# Patient Record
Sex: MALE | Race: WHITE | Employment: FULL TIME | URBAN - METROPOLITAN AREA
[De-identification: names, ages, dates, MRNs, and addresses within clinical notes are randomized per-mention and may not be internally consistent; named-entity substitution may affect disease eponyms.]

---

## 2019-09-11 ENCOUNTER — HOSPITAL ENCOUNTER (OUTPATIENT)
Age: 49
Setting detail: OUTPATIENT SURGERY
Discharge: HOME | End: 2019-09-11
Attending: UROLOGY | Admitting: UROLOGY
Payer: COMMERCIAL

## 2019-09-11 VITALS
DIASTOLIC BLOOD PRESSURE: 76 MMHG | HEIGHT: 76 IN | WEIGHT: 172.56 LBS | OXYGEN SATURATION: 96 % | HEART RATE: 63 BPM | TEMPERATURE: 98.1 F | SYSTOLIC BLOOD PRESSURE: 114 MMHG | BODY MASS INDEX: 21.01 KG/M2 | RESPIRATION RATE: 18 BRPM

## 2019-09-11 DIAGNOSIS — N20.0 KIDNEY STONE: Primary | ICD-10-CM

## 2019-09-11 PROCEDURE — 74011250636 HC RX REV CODE- 250/636

## 2019-09-11 PROCEDURE — 99153 MOD SED SAME PHYS/QHP EA: CPT

## 2019-09-11 PROCEDURE — 77030040361 HC SLV COMPR DVT MDII -B: Performed by: UROLOGY

## 2019-09-11 PROCEDURE — 76210000020 HC REC RM PH II FIRST 0.5 HR: Performed by: UROLOGY

## 2019-09-11 PROCEDURE — 77030020782 HC GWN BAIR PAWS FLX 3M -B: Performed by: UROLOGY

## 2019-09-11 PROCEDURE — 50590 FRAGMENTING OF KIDNEY STONE: CPT | Performed by: UROLOGY

## 2019-09-11 PROCEDURE — 99152 MOD SED SAME PHYS/QHP 5/>YRS: CPT

## 2019-09-11 PROCEDURE — 74011250636 HC RX REV CODE- 250/636: Performed by: UROLOGY

## 2019-09-11 RX ORDER — SODIUM CHLORIDE, SODIUM LACTATE, POTASSIUM CHLORIDE, CALCIUM CHLORIDE 600; 310; 30; 20 MG/100ML; MG/100ML; MG/100ML; MG/100ML
125 INJECTION, SOLUTION INTRAVENOUS CONTINUOUS
Status: DISCONTINUED | OUTPATIENT
Start: 2019-09-11 | End: 2019-09-11 | Stop reason: HOSPADM

## 2019-09-11 RX ORDER — TAMSULOSIN HYDROCHLORIDE 0.4 MG/1
0.4 CAPSULE ORAL DAILY
COMMUNITY

## 2019-09-11 RX ORDER — NALOXONE HYDROCHLORIDE 1 MG/ML
1 INJECTION INTRAMUSCULAR; INTRAVENOUS; SUBCUTANEOUS AS NEEDED
Status: DISCONTINUED | OUTPATIENT
Start: 2019-09-11 | End: 2019-09-11 | Stop reason: HOSPADM

## 2019-09-11 RX ORDER — FLUMAZENIL 0.1 MG/ML
0.5 INJECTION INTRAVENOUS AS NEEDED
Status: DISCONTINUED | OUTPATIENT
Start: 2019-09-11 | End: 2019-09-11 | Stop reason: HOSPADM

## 2019-09-11 RX ORDER — OXYCODONE AND ACETAMINOPHEN 5; 325 MG/1; MG/1
2 TABLET ORAL
Status: DISCONTINUED | OUTPATIENT
Start: 2019-09-11 | End: 2019-09-11 | Stop reason: HOSPADM

## 2019-09-11 RX ORDER — HYDROCODONE BITARTRATE AND ACETAMINOPHEN 10; 325 MG/1; MG/1
1 TABLET ORAL
Qty: 10 TAB | Refills: 0 | Status: SHIPPED | OUTPATIENT
Start: 2019-09-11 | End: 2019-09-21

## 2019-09-11 RX ORDER — CEFAZOLIN SODIUM/WATER 2 G/20 ML
2 SYRINGE (ML) INTRAVENOUS ONCE
Status: COMPLETED | OUTPATIENT
Start: 2019-09-11 | End: 2019-09-11

## 2019-09-11 RX ORDER — TAMSULOSIN HYDROCHLORIDE 0.4 MG/1
0.4 CAPSULE ORAL DAILY
Qty: 15 CAP | Refills: 0 | Status: SHIPPED | OUTPATIENT
Start: 2019-09-11

## 2019-09-11 RX ORDER — OXYCODONE AND ACETAMINOPHEN 5; 325 MG/1; MG/1
1 TABLET ORAL
Status: DISCONTINUED | OUTPATIENT
Start: 2019-09-11 | End: 2019-09-11 | Stop reason: HOSPADM

## 2019-09-11 RX ORDER — FENTANYL CITRATE 50 UG/ML
300 INJECTION, SOLUTION INTRAMUSCULAR; INTRAVENOUS AS NEEDED
Status: DISCONTINUED | OUTPATIENT
Start: 2019-09-11 | End: 2019-09-11 | Stop reason: HOSPADM

## 2019-09-11 RX ORDER — HYDROCODONE BITARTRATE AND ACETAMINOPHEN 10; 325 MG/1; MG/1
1 TABLET ORAL
Status: ON HOLD | COMMUNITY
End: 2019-09-11 | Stop reason: SDUPTHER

## 2019-09-11 RX ORDER — MIDAZOLAM HYDROCHLORIDE 5 MG/ML
6 INJECTION INTRAMUSCULAR; INTRAVENOUS AS NEEDED
Status: DISCONTINUED | OUTPATIENT
Start: 2019-09-11 | End: 2019-09-11 | Stop reason: HOSPADM

## 2019-09-11 RX ADMIN — SODIUM CHLORIDE, SODIUM LACTATE, POTASSIUM CHLORIDE, AND CALCIUM CHLORIDE 125 ML/HR: 600; 310; 30; 20 INJECTION, SOLUTION INTRAVENOUS at 14:06

## 2019-09-11 NOTE — PROGRESS NOTES
H&P Update:  Marylen Baumann was seen and examined. History and physical has been reviewed. The patient has been examined.  There have been no significant clinical changes since the completion of the originally dated History and Physical.

## 2019-09-11 NOTE — PERIOP NOTES
Patient discharged. Discharge instructions reviewed with patient and mother, all questions answered. Prescription provided to take to pharmacy, side effects discussed. Hat, strainer, and specimen cup given to patient. Patient left unit with mother to go home.

## 2019-09-11 NOTE — DISCHARGE INSTRUCTIONS
Patient Education        Laser Lithotripsy: What to Expect at [address] lithotripsy is a way to treat kidney stones. This treatment uses a laser to break kidney stones into tiny pieces. For several hours after the procedure you may have a burning feeling when you urinate. You may feel the urge to go even if you don't need to. This feeling should go away within a day. Drinking a lot of water can help. Your doctor also may advise you to take medicine that numbs the burning. This medicine is called phenazopyridine. It is available by prescription and over the counter. Brand names include Pyridium and Uristat. Your doctor may prescribe an antibiotic. This will help prevent an infection. You may have some blood in your urine for 2 or 3 days. Your doctor may have placed a small tube inside one of your ureters. Ureters are the tubes that connect the kidneys to the bladder. The small tube the doctor may have placed is called a stent. It may help the stone fragments pass through your body. Your doctor may remove the stent in a few weeks. Most stone fragments that are not removed pass out of the body within 24 hours. But sometimes it can take many weeks. If you have a large stone, you may need to come back for more treatments. This care sheet gives you a general idea about how long it will take for you to recover. But each person recovers at a different pace. Follow the steps below to feel better as quickly as possible. How can you care for yourself at home? Activity    · Rest as much as you need to after you go home.     · You may do your regular activities. But avoid hard exercise or sports for about a week or until there is no blood in your urine. Diet    · You can eat your normal diet after lithotripsy.     · Continue to drink plenty of fluids, enough so that your urine is light yellow or clear like water.  If you have kidney, heart, or liver disease and have to limit fluids, talk with your doctor before you increase the amount of fluids you drink. Medicines    · Your doctor will tell you if and when you can restart your medicines. He or she will also give you instructions about taking any new medicines.     · If you take blood thinners, such as warfarin (Coumadin), clopidogrel (Plavix), or aspirin, be sure to talk to your doctor. He or she will tell you if and when to start taking those medicines again. Make sure that you understand exactly what your doctor wants you to do.     · If you take medicine to stop the burning when you urinate, take it exactly as recommended. Call your doctor if you think you are having a problem with your medicine. This medicine may color your urine orange or red. This is normal. You will get more details on the specific medicine your doctor recommends.     · If your doctor prescribed antibiotics, take them as directed. Do not stop taking them just because you feel better. You need to take the full course of antibiotics.     · Be safe with medicines. Read and follow all instructions on the label. ? If the doctor gave you a prescription medicine for pain, take it as prescribed. ? If you are not taking a prescription pain medicine, ask your doctor if you can take acetaminophen (Tylenol). Do not take ibuprofen (Advil, Motrin) or naproxen (Aleve) or similar medicines unless your doctor tells you to. ? Do not take two or more pain medicines at the same time unless the doctor told you to. Many pain medicines have acetaminophen, which is Tylenol. Too much acetaminophen (Tylenol) can be harmful.    Heat    · Take a warm bath. This may soothe the burning. Other instructions    · Urinate through the strainer the doctor gives you. Save any stone pieces, including those that look like sand or gravel. Take these to your doctor. This will help your doctor find the cause of your stones. Follow-up care is a key part of your treatment and safety.  Be sure to make and go to all appointments, and call your doctor if you are having problems. It's also a good idea to know your test results and keep a list of the medicines you take. When should you call for help? Call 911 anytime you think you may need emergency care. For example, call if:    · You passed out (lost consciousness).     · You have chest pain, are short of breath, or cough up blood.    Call your doctor now or seek immediate medical care if:    · You have pain that does not get better after you take pain medicine.     · You have new or more blood clots in your urine. (It is normal for the urine to be pink for a few days.)     · You cannot urinate.     · You have symptoms of a urinary tract infection. These may include:  ? Pain or burning when you urinate. ? A frequent need to urinate without being able to pass much urine. ? Pain in the flank, which is just below the rib cage and above the waist on either side of the back. ? Blood in the urine. ? A fever.     · You are sick to your stomach or cannot drink fluids.     · You have signs of a blood clot in your leg (called a deep vein thrombosis), such as:  ? Pain in the calf, back of the knee, thigh, or groin. ? Redness and swelling in your leg.    Watch closely for any changes in your health, and be sure to contact your doctor if you have any problems. Where can you learn more? Go to http://florencio-negrito.info/. Enter Q239 in the search box to learn more about \"Laser Lithotripsy: What to Expect at Home. \"  Current as of: October 31, 2018  Content Version: 12.1  © 1835-0071 [employer]. Care instructions adapted under license by [employer] (which disclaims liability or warranty for this information). If you have questions about a medical condition or this instruction, always ask your healthcare professional. [address] any warranty or liability for your use of this information.          DISCHARGE SUMMARY from Nurse    PATIENT INSTRUCTIONS:    After general anesthesia or intravenous sedation, for 24 hours or while taking prescription Narcotics:  · Limit your activities  · Do not drive and operate hazardous machinery  · Do not make important personal or business decisions  · Do  not drink alcoholic beverages  · If you have not urinated within 8 hours after discharge, please contact your surgeon on call. Report the following to your surgeon:  · Excessive pain, swelling, redness or odor of or around the surgical area  · Temperature over 100.5  · Nausea and vomiting lasting longer than 4 hours or if unable to take medications  · Any signs of decreased circulation or nerve impairment to extremity: change in color, persistent  numbness, tingling, coldness or increase pain  · Any questions    What to do at Home:  Recommended activity: Activity as tolerated and no driving for today. If you experience any of the following symptoms fever, chills, uncontrollable pain, active bleeding, please follow up with Dr. Santa Moralez. *  Please give a list of your current medications to your Primary Care Provider. *  Please update this list whenever your medications are discontinued, doses are      changed, or new medications (including over-the-counter products) are added. *  Please carry medication information at all times in case of emergency situations. These are general instructions for a healthy lifestyle:    No smoking/ No tobacco products/ Avoid exposure to second hand smoke  Surgeon General's Warning:  Quitting smoking now greatly reduces serious risk to your health.     Obesity, smoking, and sedentary lifestyle greatly increases your risk for illness    A healthy diet, regular physical exercise & weight monitoring are important for maintaining a healthy lifestyle    You may be retaining fluid if you have a history of heart failure or if you experience any of the following symptoms:  Weight gain of 3 pounds or more overnight or 5 pounds in a week, increased swelling in our hands or feet or shortness of breath while lying flat in bed. Please call your doctor as soon as you notice any of these symptoms; do not wait until your next office visit. Patient armband removed and shredded    The discharge information has been reviewed with the patient and caregiver. The patient and caregiver verbalized understanding. Discharge medications reviewed with the patient and caregiver and appropriate educational materials and side effects teaching were provided.   ___________________________________________________________________________________________________________________________________

## 2019-09-11 NOTE — PERIOP NOTES
Reviewed PTA medication list with patient/caregiver and patient/caregiver denies any additional medications.  Patient admits to having a responsible adult care for them for at least 24 hours after surgery.   ]

## 2019-09-11 NOTE — PERIOP NOTES
Reviewed PTA medication list with patient/caregiver and patient/caregiver denies any additional medications. Patient admits to having a responsible adult care for them for at least 24 hours after surgery.     Permission granted by patient for a dual skin assessment. Dual skin assessment completed by Curtis Gunderson RN and Daria Frazier.

## 2019-09-11 NOTE — OP NOTES
Date of Procedure: 9/11/2019   Preoperative Diagnosis: LEFT RENAL STONE  Postoperative Diagnosis: Left Renal Stone    Procedure(s):  LEFT RENAL EXTRACORPORAL SHOCKWAVE LITHOTRIPSY  Surgeon(s) and Role:     * Price Crawford MD - Primary         Surgical Assistant: none    Surgical Staff:  Carroll Hager RN  Radiology Technician: Maliha Tristan RT  Event Time In Time Out   Incision Start 1528    Incision Close       Anesthesia: Con-Sed   Estimated Blood Loss: zero  Specimens: * No specimens in log *   Findings: 1.3cm left kidney stone and a 5mm just below that. Complications: none  Implants: * No implants in log *        Procedure Details: The patient was brought into the OR and was identified. Time out was called using two identifiers. He received conscious sedation using fentanyl and versed as needed by the nurse. The F1 and F2 focus points were used to locate the stone on the Left kidney. Once the stone was localized, the [employer] lithotripter machine was used for the procedure. The rate was 90 shocks per minute. Power was increased from 3 to 7. A total shock of 3000 was applied to the stone. At the completion, the stone looked to be fragmented well. The patient tolerated the procedure very well. He was aroused safely and transferred to the PACU in stable condition. Plan:  KUB in 3 weeks prior to office visit.

## 2019-09-16 NOTE — H&P
Urology [address] [place] [address] [address]  [address]  Tel: [phone]  Fax: [phone]        Patient: Alber Chery  YOB: 1970  Date: 09/10/2019 2:30 PM   Visit Type: Office Visit        Completed Orders (this encounter)  Order  Interpretation  Result  Next Lab Date  URINALYSIS, AUTO, W/O SCOPE  see detail  Test 1Color: yellow. Clarity: clear. Glucose: negative. Bilirubin: negative. Ketones: negative. Blood: trace non-hemolyzed. pH: 6.5. Protein: negative. Urobilinogen: 0.2 mg/dl  Nitrite: negative. Leukocytes: negative. Assessment/Plan  #  Detail Type  Description   1. Assessment  Kidney stone (N20.0). Patient Plan  Pt symptomatic with kidney stone passage with kub showing left 1.3mm and 5mm kidney stone. Also has rt kidney stone 5mm. Discussed r/b/a including URS and ESWL. Pt wishes for Lt ESWL and he understands risk for staged procedure with the left side treatment wishes to proceed. All questions answered. Pt will undergo PAT per hospital protocol. Plan Orders  Calculi, Urinary to be performed. The patient had the following test(s) completed today: URINALYSIS, AUTO, W/O SCOPE. This 57year old male presents for Kidney and/or Ureteral Stone. History of Present Illness:  1. Kidney and/or Ureteral Stone   Onset was sudden. Severity level is 6. There is no prior history of stones. There are no aggravating factors. Relieving factors include analgesics and passage. Pertinent negatives include chills, constipation, diarrhea, fever, nausea, dribbling (urinary), frequency (urinary) and vomiting. Additional information: Pt went to [facility]9/[facility] and has passed 4 stones since then largest 5mm. kUB today shows lt 1.3cm and 5mm lt kidney stone and rt 5mm kidney stone. No ureteral stone noted. Pt is in pain but controlled w/ pain meds. Trudy Mathis               PAST MEDICAL/SURGICAL HISTORY   (Detailed)          PROBLEM LIST:   Problem List reviewed. Problem Description  Onset Date  Chronic  Clinical Status  Notes  Kidney stone    N            Medications (active prior to today)  Medication  Instructions  Start Date  Stop Date  Refilled  Elsewhere  tamsulosin 0.4 mg capsule  take 1 capsule by oral route  every day 1/2 hour following the same meal each day  //      Y  ciprofloxacin 500 mg tablet  take 1 tablet by oral route  every 12 hours  //      Y  Norco 10 mg-325 mg tablet  take 1 tablet by oral route  every 4 - 6 hours as needed for pain  //      Y      Allergies  Ingredient  Reaction (Severity)  Medication Name  Comment  NO KNOWN ALLERGIES          Reviewed, no changes. Family History  (Detailed)      Social History:  (Detailed)  Tobacco use reviewed. Preferred language is [place]. Smoking status: Former smoker. SMOKING STATUS  Use Status  Type  Smoking Status  Usage Per Day  Years Used  Total Pack Years  yes    Former smoker          ALCOHOL  There is a history of alcohol use. consumed daily. CAFFEINE  The patient uses caffeine: tea. Review of Systems  System  Neg/Pos  Details  Constitutional  Negative  Chills and Fever. ENMT  Negative  Ear infections and Sore throat. Eyes  Negative  Blurred vision, Double vision and Eye pain. Respiratory  Negative  Asthma, Chronic cough, Dyspnea and Wheezing. Cardio  Negative  Chest pain. GI  Negative  Constipation, Decreased appetite, Diarrhea, Nausea and Vomiting.   Negative  Urinary dribbling and Urinary frequency. Endocrine  Negative  Cold intolerance, Heat intolerance, Increased thirst and Weight loss. Neuro  Negative  Headache and Tremors. Psych  Negative  Anxiety and Depression. Integumentary  Negative  Itching skin and Rash. MS  Negative  Back pain and Joint pain. Hema/Lymph  Negative  Easy bleeding. Reproductive  Negative  Sexual dysfunction.     Vital Signs    Height  Time  ft  in  cm  Last Measured  Height Position  2:59 PM  6.0  4.00  193.04        Weight/BSA/BMI  Time  lb  oz  kg  Context  BMI kg/m2  BSA m2  2:59 PM  180.00    81.647    21.91      Blood Pressure  Time  BP mm/Hg  Position  Side  Site  Method  Cuff Size  2:59 PM  153/82              Temperature/Pulse/Respiration  Time  Temp F  Temp C  Temp Site  Pulse/min  Pattern  Resp/ min  2:59 PM  98.90  37.17    100        Measured By  Time  Measured by  2:59 PM  Ira Olea      Physical Exam  Exam  Findings  Details  Constitutional  Normal  Well developed. Head/Face  Normal  Facial features - Normal.  Nose/Mouth/Throat  Normal  Tongue - Normal. Buccal mucosa - Normal.  Lymph Detail  Normal  Submandibular. Supraclavicular. Respiratory  Normal  Inspection - Normal. Effort - Normal.  Abdomen  Normal  No abdominal tenderness. No hepatic enlargement. No hepatic enlargement. No spleen enlargement. No hernia. Genitourinary  Normal  Penis - Normal. Urethral meatus - Normal. Scrotum - Normal. Epididymides - Normal. Lymph nodes - Normal. Testes - Normal. No CVA tenderness. No suprapubic tenderness. Musculoskeletal  Normal  Visual overview of all four extremities is normal. Gait - Normal.  Extremity  Normal  No Edema. No Calf tenderness. Neurological  Normal  Level of consciousness - Normal. Orientation - Normal. Memory - Normal.  Psychiatric  Normal  Oriented to time, place, person and situation. Appropriate mood and affect. Patient Education  #  Patient Education  1.   Kidney Stone: Care Instructions    Medications (added, continued, or stopped today)  Start Date  Medication  Directions  PRN Status  PRN Reason  Instruction  Stop Date    ciprofloxacin 500 mg tablet  take 1 tablet by oral route  every 12 hours  N          Norco 10 mg-325 mg tablet  take 1 tablet by oral route  every 4 - 6 hours as needed for pain  N          tamsulosin 0.4 mg capsule  take 1 capsule by oral route  every day 1/2 hour following the same meal each day  N        Active Patient Care Team Members    Name  Contact  Agency Type  Support Role  Relationship  Active Date  Inactive Date  Specialty  [id number]      Emergency Contact  Parent, Child is the Patient            Provider: Chayo Ramirez MD 09/10/2019 02:30 PM  Document generated by:  Ciara Salmeron 09/11/2019 05:55 AM

## (undated) DEVICE — GARMENT,MEDLINE,DVT,INT,CALF,MED, GEN2: Brand: MEDLINE

## (undated) DEVICE — AIRLIFE™ ADULT OXYGEN MASK VINYL, UNDER-THE-CHIN STYLE, 3 IN 1 MASK WITH 7 FEET (2.1 M) CRUSH-RESISTANT TUBING AND U/CONNECT-IT ADAPTER: Brand: AIRLIFE™

## (undated) DEVICE — STRAP,POSITIONING,KNEE/BODY,FOAM,4X60": Brand: MEDLINE